# Patient Record
Sex: MALE | Race: OTHER | HISPANIC OR LATINO | Employment: FULL TIME | URBAN - METROPOLITAN AREA
[De-identification: names, ages, dates, MRNs, and addresses within clinical notes are randomized per-mention and may not be internally consistent; named-entity substitution may affect disease eponyms.]

---

## 2019-01-12 ENCOUNTER — HOSPITAL ENCOUNTER (EMERGENCY)
Facility: MEDICAL CENTER | Age: 34
End: 2019-01-12
Attending: EMERGENCY MEDICINE
Payer: COMMERCIAL

## 2019-01-12 VITALS
BODY MASS INDEX: 24.38 KG/M2 | DIASTOLIC BLOOD PRESSURE: 89 MMHG | SYSTOLIC BLOOD PRESSURE: 136 MMHG | HEART RATE: 79 BPM | WEIGHT: 174.16 LBS | OXYGEN SATURATION: 94 % | RESPIRATION RATE: 16 BRPM | HEIGHT: 71 IN | TEMPERATURE: 97.7 F

## 2019-01-12 DIAGNOSIS — S06.0X0A CONCUSSION WITHOUT LOSS OF CONSCIOUSNESS, INITIAL ENCOUNTER: ICD-10-CM

## 2019-01-12 PROCEDURE — 99283 EMERGENCY DEPT VISIT LOW MDM: CPT

## 2019-01-12 ASSESSMENT — PAIN SCALES - GENERAL: PAINLEVEL_OUTOF10: 1

## 2019-01-12 NOTE — DISCHARGE INSTRUCTIONS
As we discussed, take it very easy the rest of the weekend, avoid alcohol, caffeine, sugary drinks, junk food, heavy exertion, vigorous physical activity, and anything that makes you feel more fatigued, or gives a headache or nausea.  If you are having severe pain, visual disturbances, difficulty walking, or any worsening symptoms, return for reevaluation.  Most mild concussions will fade away over a few days.

## 2019-01-12 NOTE — ED TRIAGE NOTES
Pt amb to triage.  Chief Complaint   Patient presents with   • T-5000 GLF     slip and fall on stair yesterday, fell back and hit back of head, -loc   • Head Pain   • Neck Pain   • Faint     Pt states he was driving today and felt like he was going to pass out so he had to pull over.   Currently AOx4. HYATT.

## 2019-01-12 NOTE — ED PROVIDER NOTES
"ED Provider Note    Scribed for John Eastman M.D. by John Eastman. 1/12/2019  2:50 PM    CHIEF COMPLAINT  Chief Complaint   Patient presents with   • T-5000 GLF     slip and fall on stair yesterday, fell back and hit back of head, -loc   • Head Pain   • Neck Pain   • Faint       HPI  Deven Harrison is a 33 y.o. male who presents to the Emergency Room after feeling faint while driving his car shortly prior to arrival.  He says that he felt like he was going to pass out, so he pulled his car over.  He reports that this is in the context of a slip and fall on stairs yesterday.  He was pulling a loaded furniture alba upstairs, facing up the stairs with his back.  He slipped at the last step, fell onto his back, and also hit his head.  He fell and hit the back of his head, but denies loss of consciousness.  He said he felt okay at the time, but today has developed a little bit of a sore neck and a dull headache.  He says that this morning, he went to a movie, and when he came out of the movie theater, felt slightly \"disoriented,\" and that feeling persisted as he started to drive away from the movie theater.  He pulled over, the feeling went away, and then he drove home without difficulty, but was disturbed by the previous sensation, and so drove himself to the emergency department, again without difficulty.  He is ambulatory with an independent, stable gait, he is fully alert and oriented with no evidence of distress.      REVIEW OF SYSTEMS  See HPI for further details.    PAST MEDICAL HISTORY   No anticoagulation or easy bleeding or bruising.    SOCIAL HISTORY  Social History     Social History Main Topics   • Smoking status: Never Smoker   • Smokeless tobacco: Never Used   • Alcohol use Yes   • Drug use: No   • Sexual activity: Not on file       SURGICAL HISTORY  patient denies any surgical history    CURRENT MEDICATIONS  Home Medications     Reviewed by Alfonzo Ortiz R.N. (Registered Nurse) on " "01/12/19 at 1441  Med List Status: <None>   Medication Last Dose Status        Patient Margarito Taking any Medications                       ALLERGIES  Allergies no known allergies    PHYSICAL EXAM  VITAL SIGNS: /89   Pulse 79   Temp 36.5 °C (97.7 °F) (Temporal)   Resp 16   Ht 1.803 m (5' 11\")   Wt 79 kg (174 lb 2.6 oz)   SpO2 94%   BMI 24.29 kg/m²   Pulse ox interpretation: I interpret this pulse ox as normal.  Constitutional: Alert in no apparent distress.  HENT: No visible or palpable signs of trauma, no scalp tenderness, bilateral external ears normal, Nose normal.   Eyes: Pupils are equal and reactive, Conjunctiva normal, Non-icteric.   Neck: Normal range of motion, No tenderness, Supple, No stridor.  No midline bony tenderness, no bruising, step-offs, or deformity.  Cardiovascular: Normal peripheral perfusion  Thorax & Lungs: Unlabored respirations, equal chest expansion, no accessory muscle use  Abdomen: Non-distended  Skin:  No erythema, No rash.   Back: Normal alignment and ROM  Extremities: No gross deformity  Musculoskeletal: Good range of motion in all major joints.   Neurologic: Alert, Normal motor function, No focal deficits noted.  Stable gait.  No pronator drift.  Finger-nose testing intact.  PERRLA, EOMI, speech is clear.  No focal neurologic deficits.  Psychiatric: Affect normal, Judgment normal, Mood normal.      COURSE & MEDICAL DECISION MAKING  The patient's VS, Nurses notes reviewed. (See chart for details)    2:50 PM Patient seen and examined at bedside.  We discussed that he probably has a mild concussion, and that intense stimuli such as a movie in a theater, especially in action may be like when he was watching, can make concussive symptoms worse or even caused him to present, when he was otherwise feeling well.  He is not at risk for intracranial bleed due to any anticoagulation or easy bleeding or bruising.  He is not a candidate for head CT based on his symptoms and physical " exam.  We discussed concussion precautions, and he is advised to take the rest of the week and very easy.  Today is Saturday afternoon, and is not scheduled to work until Monday.  We had a careful discussion regarding return precautions, and the types of activities to avoid, including not drinking alcohol or caffeine or sugary drinks, and to keep an eye on his symptoms and avoid anything that makes him feel worse.       The patient will return for new or worsening symptoms and is stable at the time of discharge.    The patient is referred to a primary physician for blood pressure management, diabetic screening, and for all other preventative health concerns.      DISPOSITION:  Patient will be discharged home in stable condition.    FOLLOW UP:  [facility], Emergency Dept  [address] [address] [phone]    If symptoms worsen      OUTPATIENT MEDICATIONS:  New Prescriptions    No medications on file         FINAL IMPRESSION  1. Concussion without loss of consciousness, initial encounter Active

## 2020-12-05 ENCOUNTER — HOSPITAL ENCOUNTER (OUTPATIENT)
Dept: LAB | Facility: MEDICAL CENTER | Age: 35
End: 2020-12-05
Attending: NURSE PRACTITIONER
Payer: COMMERCIAL

## 2020-12-05 ENCOUNTER — TELEMEDICINE (OUTPATIENT)
Dept: TELEHEALTH | Facility: TELEMEDICINE | Age: 35
End: 2020-12-05
Payer: COMMERCIAL

## 2020-12-05 DIAGNOSIS — Z20.822 EXPOSURE TO COVID-19 VIRUS: Primary | ICD-10-CM

## 2020-12-05 DIAGNOSIS — R11.0 NAUSEA: ICD-10-CM

## 2020-12-05 DIAGNOSIS — Z20.822 EXPOSURE TO COVID-19 VIRUS: ICD-10-CM

## 2020-12-05 PROCEDURE — 99203 OFFICE O/P NEW LOW 30 MIN: CPT | Mod: 95,CR,CS | Performed by: NURSE PRACTITIONER

## 2020-12-05 PROCEDURE — U0003 INFECTIOUS AGENT DETECTION BY NUCLEIC ACID (DNA OR RNA); SEVERE ACUTE RESPIRATORY SYNDROME CORONAVIRUS 2 (SARS-COV-2) (CORONAVIRUS DISEASE [COVID-19]), AMPLIFIED PROBE TECHNIQUE, MAKING USE OF HIGH THROUGHPUT TECHNOLOGIES AS DESCRIBED BY CMS-2020-01-R: HCPCS

## 2020-12-05 ASSESSMENT — ENCOUNTER SYMPTOMS
ABDOMINAL PAIN: 0
ANOREXIA: 0
SWOLLEN GLANDS: 0
VOMITING: 0
FEVER: 0
NAUSEA: 1
CHANGE IN BOWEL HABIT: 0
HEADACHES: 0
FATIGUE: 0
SORE THROAT: 0
COUGH: 0
ARTHRALGIAS: 0
CHILLS: 0

## 2020-12-05 NOTE — PROGRESS NOTES
Virtual Visit: New Patient   This visit was conducted via Zoom using secure and encrypted videoconferencing technology. The patient was in a private location in the state of [place].    The patient's identity was confirmed and verbal consent was obtained for this virtual visit.    Subjective:     CC: Exposure to Covid, mild symptoms    Deven Harrison is a 35 y.o. male presenting to establish care and to discuss the evaluation and management of:      Patient reports he was exposed on Monday to a Covid positive.   He reports mild breathing symptoms yesterday but was able to complete workouts appropriately.  Reports he did have nausea before that but it has now resolved.     Nausea  This is a new problem. The current episode started yesterday. The problem occurs intermittently. The problem has been gradually improving. Associated symptoms include nausea. Pertinent negatives include no abdominal pain, anorexia, arthralgias, change in bowel habit, chest pain, chills, congestion, coughing, fatigue, fever, headaches, rash, sore throat, swollen glands or vomiting. Nothing aggravates the symptoms. Treatments tried: Theraflu. The treatment provided mild relief.       ROS  See HPI  Constitutional: Negative for fever, chills and malaise/fatigue.   HENT: Negative for congestion.    Eyes: Negative for pain.   Respiratory: Negative for cough and shortness of breath.    Cardiovascular: Negative for leg swelling.   Gastrointestinal: Positive for nauseaNegative for vomiting, abdominal pain and diarrhea.   Genitourinary: Negative for dysuria and hematuria.   Skin: Negative for rash.   Neurological: Negative for dizziness, focal weakness and headaches.   Endo/Heme/Allergies: Does not bruise/bleed easily.   Psychiatric/Behavioral: Negative for depression.  The patient is not nervous/anxious.      No Known Allergies    Current medicines (including changes today)  No current outpatient medications on file.     No current  facility-administered medications for this visit.        He  has no past medical history on file.  He  has no past surgical history on file.      History reviewed. No pertinent family history.  No family status information on file.       There are no active problems to display for this patient.         Objective:   Resp: 14 visualized  Temp: 98.9 f pt report  Wt 174 lbs    Physical Exam:  Constitutional: Alert, no distress, well-groomed.  Skin: No rashes in visible areas.  Eye: Round. Conjunctiva clear, lids normal. No icterus.   ENMT: Lips pink without lesions, good dentition, moist mucous membranes. Phonation normal.  Neck: No masses, no thyromegaly. Moves freely without pain.  Respiratory: Unlabored respiratory effort, no cough or audible wheeze  Psych: Alert and oriented x3, normal affect and mood.       Assessment and Plan:   The following treatment plan was discussed:     1. Exposure to COVID-19 virus  - COVID/SARS COV-2 PCR; Future    2. Nausea  - COVID/SARS COV-2 PCR; Future  Advised patient to go thru [facility] thru testing site.   Open 7-2:30 M-Saturday.   May take over-the-counter Ibuprofen 400-600 mg every 8 hours as needed for pain    Await Covid results.    Discussed with patient that symptoms are suspicious for Covid-19 infection vs other viral illness.  Patient is in no acute distress.  Patient is advised to self isolate and provided with self isolation precautions .  Discussed when to return to urgent care or ER including for worsening shortness of breath.  Patient verbalized understanding and has no additional questions.    Plan of care, medications and treatments reviewed with patient and or guardian.  Patient and or guardian voices understanding and agrees with the instructions provided Patient and or guardian understand the parameters for reevaluation and ER precautions discussed.     Please note that this dictation was created using voice recognition software. I have made every reasonable  attempt to correct obvious errors, but I expect that there are errors of grammar and possibly content that I did not discover before finalizing the note.     Follow-up: Return if symptoms worsen or fail to improve.

## 2020-12-06 LAB
COVID ORDER STATUS COVID19: NORMAL
SARS-COV-2 RNA RESP QL NAA+PROBE: NOTDETECTED
SPECIMEN SOURCE: NORMAL

## 2022-11-07 ENCOUNTER — TELEPHONE (OUTPATIENT)
Dept: SCHEDULING | Facility: IMAGING CENTER | Age: 37
End: 2022-11-07

## 2022-11-08 ENCOUNTER — OFFICE VISIT (OUTPATIENT)
Dept: MEDICAL GROUP | Facility: PHYSICIAN GROUP | Age: 37
End: 2022-11-08
Payer: COMMERCIAL

## 2022-11-08 VITALS
BODY MASS INDEX: 27.8 KG/M2 | HEIGHT: 70 IN | WEIGHT: 194.2 LBS | SYSTOLIC BLOOD PRESSURE: 108 MMHG | OXYGEN SATURATION: 97 % | TEMPERATURE: 97.6 F | HEART RATE: 63 BPM | DIASTOLIC BLOOD PRESSURE: 68 MMHG

## 2022-11-08 DIAGNOSIS — Z11.3 SCREEN FOR STD (SEXUALLY TRANSMITTED DISEASE): ICD-10-CM

## 2022-11-08 DIAGNOSIS — Z23 NEED FOR VACCINATION: ICD-10-CM

## 2022-11-08 DIAGNOSIS — E66.3 OVERWEIGHT (BMI 25.0-29.9): ICD-10-CM

## 2022-11-08 DIAGNOSIS — Z00.00 PREVENTATIVE HEALTH CARE: ICD-10-CM

## 2022-11-08 PROCEDURE — 90746 HEPB VACCINE 3 DOSE ADULT IM: CPT | Performed by: FAMILY MEDICINE

## 2022-11-08 PROCEDURE — 90686 IIV4 VACC NO PRSV 0.5 ML IM: CPT | Performed by: FAMILY MEDICINE

## 2022-11-08 PROCEDURE — 99214 OFFICE O/P EST MOD 30 MIN: CPT | Mod: 25 | Performed by: FAMILY MEDICINE

## 2022-11-08 PROCEDURE — 90471 IMMUNIZATION ADMIN: CPT | Performed by: FAMILY MEDICINE

## 2022-11-08 PROCEDURE — 90472 IMMUNIZATION ADMIN EACH ADD: CPT | Performed by: FAMILY MEDICINE

## 2022-11-08 ASSESSMENT — PATIENT HEALTH QUESTIONNAIRE - PHQ9: CLINICAL INTERPRETATION OF PHQ2 SCORE: 0

## 2022-11-08 NOTE — PROGRESS NOTES
"CHIEF COMPLAINT / REASON FOR VISIT  Deven Harrison is a 37 y.o. male that presents today to establish care.    HISTORY OF PRESENT ILLNESS  Just wants check up.    Exercise: 3-4 days of resistance training; cardio 20 min 3-4 days per week  Diet: whole foods, minimally processed foods    Past Medical History  none    Past Surgical History  Unknown surgery at age 6    Social History  - Work:  (very physical)  - Alcohol: occasional  - Tobacco/nicotine: occasional cigarettes   - Sex: monogamous with female    Family History  Father - type 2 diabetes    OBJECTIVE    /68 (BP Location: Left arm, Patient Position: Sitting, BP Cuff Size: Adult)   Pulse 63   Temp 36.4 °C (97.6 °F) (Temporal)   Ht 1.778 m (5' 10\")   Wt 88.1 kg (194 lb 3.2 oz)   SpO2 97%   BMI 27.86 kg/m²  Body mass index is 27.86 kg/m².    PHYSICAL EXAM  Constitutional: Sitting comfortably, in no acute distress, responds to questions appropriately.  Head: Normocephalic  Eyes:  No conjunctival injection, no scleral icterus, PERRL  Ears: External ear canals clear, right TM scarred   Mouth: Oral mucosa moist. Good dentition  Throat: Oropharynx clear without erythema or tonsillar exudates  Neck: No cervical or supraclavicular lymphadenopathy  Heart: Regular S1 S2, no murmurs, rub, or gallops  Lungs: Clear to auscultation bilaterally, no wheezes, rales, or rhonchi  Abdomen: Soft, nontender, nondistended  Skin: Warm and dry, no rashes or lesions on face or exposed upper extremities    ASSESSMENT & PLAN  1. Overweight (BMI 25.0-29.9)  Although his BMI is 27.86, on exam his body fat percentage actually appears to be fairly low, and he is muscular which is likely increasing his weight.  We will obtain lipid profile, apo B, LP(a), fasting glucose for preventative purposes.  We discussed the importance of regular aerobic exercise, and the importance of maintaining strength and muscle mass.  - Lipid Profile; Future  - Blood Glucose; Future  - " APOLIPOPROTEIN B; Future  - Lipoprotein (a); Future    2. Need for vaccination  - INFLUENZA VACCINE QUAD INJ (PF)  - Hepatitis B Vaccine Adult IM    3. Screen for STD (sexually transmitted disease)  - Chlamydia/GC, PCR (Urine); Future  - HIV AG/AB Combo Assay Screening; Future  - T.Pallidum AB SONAL (Screening); Future  - Hepatitis C Virus Antibody; Future  - HEP B Surface Antibody; Future  - Hep B Core AB Total; Future  - Hep B Surface Antigen; Future    4. Preventative health care  - APOLIPOPROTEIN B; Future  - Lipoprotein (a); Future

## 2022-11-11 ENCOUNTER — HOSPITAL ENCOUNTER (OUTPATIENT)
Dept: LAB | Facility: MEDICAL CENTER | Age: 37
End: 2022-11-11
Attending: FAMILY MEDICINE
Payer: COMMERCIAL

## 2022-11-11 DIAGNOSIS — Z11.3 SCREEN FOR STD (SEXUALLY TRANSMITTED DISEASE): ICD-10-CM

## 2022-11-11 DIAGNOSIS — E66.3 OVERWEIGHT (BMI 25.0-29.9): ICD-10-CM

## 2022-11-11 DIAGNOSIS — Z00.00 PREVENTATIVE HEALTH CARE: ICD-10-CM

## 2022-11-11 LAB
CHOLEST SERPL-MCNC: 154 MG/DL (ref 100–199)
GLUCOSE SERPL-MCNC: 88 MG/DL (ref 65–99)
HBV CORE AB SERPL QL IA: NONREACTIVE
HBV SURFACE AB SERPL IA-ACNC: ABNORMAL MIU/ML (ref 0–10)
HBV SURFACE AG SER QL: NORMAL
HCV AB SER QL: NORMAL
HDLC SERPL-MCNC: 52 MG/DL
HIV 1+2 AB+HIV1 P24 AG SERPL QL IA: NORMAL
LDLC SERPL CALC-MCNC: 89 MG/DL
T PALLIDUM AB SER QL IA: NORMAL
TRIGL SERPL-MCNC: 66 MG/DL (ref 0–149)

## 2022-11-11 PROCEDURE — 87389 HIV-1 AG W/HIV-1&-2 AB AG IA: CPT

## 2022-11-11 PROCEDURE — 82172 ASSAY OF APOLIPOPROTEIN: CPT

## 2022-11-11 PROCEDURE — 36415 COLL VENOUS BLD VENIPUNCTURE: CPT

## 2022-11-11 PROCEDURE — 83695 ASSAY OF LIPOPROTEIN(A): CPT

## 2022-11-11 PROCEDURE — 86704 HEP B CORE ANTIBODY TOTAL: CPT

## 2022-11-11 PROCEDURE — 87491 CHLMYD TRACH DNA AMP PROBE: CPT

## 2022-11-11 PROCEDURE — 86780 TREPONEMA PALLIDUM: CPT

## 2022-11-11 PROCEDURE — 86803 HEPATITIS C AB TEST: CPT

## 2022-11-11 PROCEDURE — 80061 LIPID PANEL: CPT

## 2022-11-11 PROCEDURE — 87340 HEPATITIS B SURFACE AG IA: CPT

## 2022-11-11 PROCEDURE — 86706 HEP B SURFACE ANTIBODY: CPT

## 2022-11-11 PROCEDURE — 82947 ASSAY GLUCOSE BLOOD QUANT: CPT

## 2022-11-11 PROCEDURE — 87591 N.GONORRHOEAE DNA AMP PROB: CPT

## 2022-11-12 LAB
C TRACH DNA SPEC QL NAA+PROBE: NEGATIVE
N GONORRHOEA DNA SPEC QL NAA+PROBE: NEGATIVE
SPECIMEN SOURCE: NORMAL

## 2022-11-13 LAB — APO B100 SERPL-MCNC: 74 MG/DL (ref 55–140)

## 2022-11-14 LAB — LPA SERPL-MCNC: 9 MG/DL

## 2024-05-08 ENCOUNTER — DOCUMENTATION (OUTPATIENT)
Dept: HEALTH INFORMATION MANAGEMENT | Facility: OTHER | Age: 39
End: 2024-05-08
Payer: COMMERCIAL